# Patient Record
Sex: FEMALE | Race: WHITE | Employment: OTHER | URBAN - METROPOLITAN AREA
[De-identification: names, ages, dates, MRNs, and addresses within clinical notes are randomized per-mention and may not be internally consistent; named-entity substitution may affect disease eponyms.]

---

## 2023-01-19 ENCOUNTER — HOSPITAL ENCOUNTER (EMERGENCY)
Age: 73
Discharge: HOME | End: 2023-01-19
Attending: EMERGENCY MEDICINE
Payer: MEDICARE

## 2023-01-19 VITALS
DIASTOLIC BLOOD PRESSURE: 85 MMHG | SYSTOLIC BLOOD PRESSURE: 168 MMHG | WEIGHT: 145 LBS | BODY MASS INDEX: 25.69 KG/M2 | RESPIRATION RATE: 20 BRPM | OXYGEN SATURATION: 98 % | TEMPERATURE: 97.5 F | HEIGHT: 63 IN | HEART RATE: 95 BPM

## 2023-01-19 DIAGNOSIS — S81.811A LACERATION OF RIGHT LOWER EXTREMITY EXCLUDING THIGH, INITIAL ENCOUNTER: Primary | ICD-10-CM

## 2023-01-19 PROCEDURE — 90715 TDAP VACCINE 7 YRS/> IM: CPT | Performed by: EMERGENCY MEDICINE

## 2023-01-19 PROCEDURE — 99284 EMERGENCY DEPT VISIT MOD MDM: CPT

## 2023-01-19 PROCEDURE — 6360000002 HC RX W HCPCS: Performed by: EMERGENCY MEDICINE

## 2023-01-19 PROCEDURE — 12001 RPR S/N/AX/GEN/TRNK 2.5CM/<: CPT

## 2023-01-19 PROCEDURE — 90471 IMMUNIZATION ADMIN: CPT | Performed by: EMERGENCY MEDICINE

## 2023-01-19 RX ADMIN — TETANUS TOXOID, REDUCED DIPHTHERIA TOXOID AND ACELLULAR PERTUSSIS VACCINE, ADSORBED 0.5 ML: 5; 2.5; 8; 8; 2.5 SUSPENSION INTRAMUSCULAR at 09:15

## 2023-01-19 ASSESSMENT — PAIN - FUNCTIONAL ASSESSMENT: PAIN_FUNCTIONAL_ASSESSMENT: NONE - DENIES PAIN

## 2023-01-19 NOTE — ED PROVIDER NOTES
[facility] Emergency Department      Pt Name: Julieth Adam  MRN: [id number]  [place] 1950  Date of evaluation: 1/19/2023  Provider: Nataly May MD  CHIEF COMPLAINT  Chief Complaint   Patient presents with    Laceration     HPI  Julieth Adam is a 74 y.o. female who presents because of laceration to the right leg. She bumped her leg against the sharp edge of a recycling container at the hotel and cut her leg. She says there was a lot of bleeding. She does not recall her last tetanus shot. Denies any other injury. REVIEW OF SYSTEMS:  No weakness, no numbness Pertinent positives and negatives as per the HPI. All other pertinent review of systems reviewed and negative. Nursing notes reviewed. PAST MEDICAL HISTORY  History reviewed. No pertinent past medical history. SURGICAL HISTORY  History reviewed. No pertinent surgical history. MEDICATIONS:  No current facility-administered medications on file prior to encounter. No current outpatient medications on file prior to encounter. ALLERGIES  Patient has no allergy information on record. SOCIAL HISTORY:  Social History     Tobacco Use    Smoking status: Never    Smokeless tobacco: Never   Vaping Use    Vaping Use: Never used   Substance Use Topics    Alcohol use: Yes     Comment: social    Drug use: Never     IMMUNIZATIONS:  There is no immunization history for the selected administration types on file for this patient. PHYSICAL EXAM  VITAL SIGNS:  Blood pressure (!) 168/85, pulse 95, temperature 97.5 °F (36.4 °C), temperature source Oral, resp. rate 20, height 5' 3\" (1.6 m), weight 145 lb (65.8 kg), SpO2 98 %.   Constitutional:  74 y.o. female nontoxic  HENT:  Mucous membranes moist, atraumatic  Neck:  Supple, no signs of injury  Thorax & Lungs:  Respiratory effort normal  Abdomen:  Non distended  Back:  No deformity  Extremities:  No cyanosis, right lower extremity over the shin has a V-shaped laceration that is 6 cm long, some bruising of the skin, no active bleeding, distally neurovascular intact, no joint swelling    RESULTS / MDM:    RADIOLOGY:  None     ED COURSE:  Uneventful  History from : Patient  Limitations to history : None  Chronic Conditions:  has no past medical history on file. Discussion with Other Profesionals : None  None  Social Determinants : None  Records Reviewed : None    Disposition Considerations (Tests not ordered but considered, Shared Decision Making, Pt Expectation of Test or Tx.):  Amylase considered but not ordered due to nonspecific nature of the test    PROCEDURES:  Laceration Repair: The patient gave permission for me to proceed. I prepped and draped the patient's wound in a sterile fashion and anesthetized the skin with 2 ml of anesthetic w/o epinephrine. I cleansed the wound with normal saline and explored the wound for foreign material, found none. I closed the wound with 7 4-0 nylon sutures with good wound approximation. The patient tolerated the procedure well. CONSULTATIONS:  Parag Rich is a 74 y.o. female who presented because of laceration. There is no evidence of associated complication. The wound has been examined and repaired. We will provide instructions on wound care and reasons to return or have wound checked prior to scheduled follow up. The stitches need to be removed in about 14 days. Differential Diagnosis:  Retained FB, neurovascular injury, hemorrhage, infection, tendon laceration, other    FOLLOW UP:    your doctor    In 2 weeks  For suture removal    [place] [id number] [facility] [address] [phone]  Go to   If symptoms worsen    FINAL IMPRESSION:    1. Laceration of right lower extremity excluding thigh, initial encounter        (Please note that I used voice recognition software to generate this note.   Occasionally words are mistranscribed despite my efforts to edit errors.)       Mary Rdz MD  01/19/23 0900

## 2023-09-06 NOTE — ED NOTES
Non stick gauze and 4x4 placed over laceration. Wrapped in [place]. Pt and  given d/c instructions with return verbalization. Pt will have SR in home state. Reviewed s/s of infection and wound care. To return with any worsening s/s.  Pt taken to car via [address] [place]  01/19/23 7107 [1] : 2 [0] : 0 [Throbbing] : throbbing [de-identified] : 4/17/23: fall 1 week ago w/ foot pain. [facility]. no prior sig foot probs. denies dm/tob.   04/26/2023:ORIF LIsfranc with 2nd tmt fusion  05/08/2023: no complaints. nwb in splint. Patient denies fevers, chills, or drainage.  05/22/2023: no complaints. nwb in boot and scooter. Patient denies fevers, chills, or drainage.  taking asa  06/05/2023: no complaints. nwb in boot and scooter. Patient denies fevers, chills, drainage.   06/26/2023:  improving. partial wb in boot. going to PT. Patient denies fevers, chills, or drainage.  07/17/2023: improving. walking in boot. going to PT. Patient denies fevers, chills, or drainage.  09/06/2023: improving. walking in regular shoes. going to PT. Patient denies fevers, chills, or drainage. [] : This patient has had an injection before: no [FreeTextEntry1] : RT Foot [de-identified] : X-ray, CT [de-identified] : 4.26.23 [de-identified] : RT foot ORIF